# Patient Record
Sex: FEMALE | Race: WHITE | NOT HISPANIC OR LATINO | URBAN - METROPOLITAN AREA
[De-identification: names, ages, dates, MRNs, and addresses within clinical notes are randomized per-mention and may not be internally consistent; named-entity substitution may affect disease eponyms.]

---

## 2022-07-16 ENCOUNTER — OFFICE VISIT (OUTPATIENT)
Dept: URGENT CARE | Facility: URGENT CARE | Age: 47
End: 2022-07-16
Payer: COMMERCIAL

## 2022-07-16 VITALS
BODY MASS INDEX: 42.95 KG/M2 | HEART RATE: 121 BPM | RESPIRATION RATE: 20 BRPM | OXYGEN SATURATION: 97 % | DIASTOLIC BLOOD PRESSURE: 79 MMHG | WEIGHT: 290 LBS | HEIGHT: 69 IN | TEMPERATURE: 96.6 F | SYSTOLIC BLOOD PRESSURE: 123 MMHG

## 2022-07-16 DIAGNOSIS — S90.32XA CONTUSION OF LEFT FOOT, INITIAL ENCOUNTER: ICD-10-CM

## 2022-07-16 DIAGNOSIS — M54.50 ACUTE RIGHT-SIDED LOW BACK PAIN WITHOUT SCIATICA: ICD-10-CM

## 2022-07-16 DIAGNOSIS — M79.672 LEFT FOOT PAIN: Primary | ICD-10-CM

## 2022-07-16 PROCEDURE — 99204 OFFICE O/P NEW MOD 45 MIN: CPT | Performed by: FAMILY MEDICINE

## 2022-07-16 RX ORDER — CYCLOBENZAPRINE HCL 10 MG
10 TABLET ORAL 3 TIMES DAILY PRN
Qty: 45 TABLET | Refills: 0 | Status: SHIPPED | OUTPATIENT
Start: 2022-07-16

## 2022-07-16 ASSESSMENT — PAIN SCALES - GENERAL: PAINLEVEL: WORST PAIN (10)

## 2022-07-16 NOTE — PROGRESS NOTES
"Chief complaint: left foot pain    Last night got up to go to bathroom and lost balance and fell on her left foot    Twisted it    Also having some right lumbar back pain radiating to the right thigh - may have twisted very quickly to avoid falling     Patient caught herself on the door    Was just too groggy. No alcohol use.  head injury:No  loss of consciousness:  No  syncope or presyncope: No  chest pain or palpitation: No     Problem list, Medication list, Allergies, and Medical/Social/Surgical histories reviewed in [facility] and updated as appropriate.        REVIEW OF SYSTEMS  General: negative for fever, constitutional symptoms or weight loss  Resp: negative for chest pain or shortness of breath  CV: negative for chest pain  : negative for dysuria , incontinence, frequency  Musculoskeletal: as above  Neurologic: negative for ataxia, saddle anesthesia, fecal or urinary incontinence, one sided weakness,  Paresthesias  Constitutional, HEENT, cardiovascular, pulmonary, gi and gu systems are negative, except as otherwise noted.    Physical Exam:  Vitals: /79   Pulse (!) 121   Temp (!) 96.6  F (35.9  C) (Tympanic)   Resp 20   Ht 1.753 m (5' 9\")   Wt 131.5 kg (290 lb)   SpO2 97%   BMI 42.83 kg/m    BMI= Body mass index is 42.83 kg/m .  Constitutional: healthy, alert and no acute distress   Head: atraumatic  NEURO: Patellar reflexes intact and equal b/l  BACK:  Straight leg raise intact, No spine tenderness  Right lumbar  paraspinal muscle tenderness to palpation, strength intact and equal b/l lower extremities. Sensory intact. Rectal exam declined/deferred.   left foot pain swelling and bruising over the 3rd 4th and 5th metatarsal which is also areas she is tender   GAIT: intact  Psychiatric: mentation appears normal and affect normal/bright      Impression:    ICD-10-CM    1. Left foot pain  M79.672 XR Foot Left G/E 3 Views     Orthopedic  Referral   2. Contusion of left foot, initial encounter  " S90.32XA    3. Acute right-sided low back pain without sciatica  M54.50          Plan:  Left foot xrays to my review no fracture official radiology report pending  Concern occult fracture  Given walking boot weight bearing as tolerated crutches as needed  Referred to ortho for follow up   Back pain likely strain  Instructions for back care and return precautions discussed.    back pain stretching excercises discussed. supportive treatment.  considery physical therapy if not better despite supportive treatment.  activity modifications advised.  Over the counter medications discussed. Patient aware to avoid NSAIDS if with any kidney disease or ulcers. Proper dosing of over the counter medications likewise discussed.  Adverse reaction to medication discussed.  aware to come in right away if with any fever or chills, worsening symptoms, headache, bowel or bladder incontinence, motor or sensory deficits or gait disturbances.   follow-up recommended.    Addendum:   Official xray report showed possible distal phalanx fracture of right toe  I did not appreciate tenderness when I examined her however I called patient to confirm and patient says she does have pain there.  Will not change treatment  Plan at this time- follow up with orthopedics  Prescribed with flexeril. Warned sedating  Sedating medications given. Aware not to drive or operate machinery while on these medications. Caution with .   Do not jennifer with alcohol or other sedating meds   Patient voiced understanding     Keena Marlow MD